# Patient Record
(demographics unavailable — no encounter records)

---

## 2025-03-20 NOTE — ASSESSMENT
[FreeTextEntry1] : 69 year old M with HTN, knee arthritis who presents for cardiac evaluation of abnormal EKG.  1) Atypical CP 2) Abnormal EKG 3) HTN - EKG 3/6/25 at Dr. Abreu showed sinus rhythm with ST-T wave abnormalities in leads V2-V6 (there are ST changes that limit plain treadmill stress EKG interpretation) - Given atypical CP and abnormal EKG, I advised pt to undergo treadmill stress echo to r/o ischemia - I advised pt to take losartan 50mg consistently  4) Follow-up, pending stress echo

## 2025-03-20 NOTE — HISTORY OF PRESENT ILLNESS
[FreeTextEntry1] : 69 year old M with HTN, knee arthritis who presents for cardiac evaluation of abnormal EKG.  Pt had an episode of mid-sternal chest discomfort that went away by itself. It was not related to exertion. He currently denies SOB, palpitation, dizziness, or LOC. He tries to exercise by doing pushups and feel okay. He has HTN but is noncompliant with losartan. EKG 3/6/25 at Dr. Abreu showed sinus rhythm with ST-T wave abnormalities in leads V2-V6.  Meds: losartan 50 daily, Jardiance 25 daily  Last seen by Dr. Wan 9/1/22 -  67 year old male retired, had one experiences of left lower chest discomfort about a month ago at rest. It was relieved after belching. Never has similar experience before this time. There was no shortness of breath or dizziness or radiation of that sensation.  At ordinary activities, with whole activities as a , he has no chest pain, no shortness of breath, no dizziness or palpitations.  He is taking medication with poor compliance for the hypertension per his description.